# Patient Record
Sex: MALE | Race: WHITE | NOT HISPANIC OR LATINO | URBAN - METROPOLITAN AREA
[De-identification: names, ages, dates, MRNs, and addresses within clinical notes are randomized per-mention and may not be internally consistent; named-entity substitution may affect disease eponyms.]

---

## 2019-11-11 PROBLEM — Z00.00 ENCOUNTER FOR PREVENTIVE HEALTH EXAMINATION: Status: ACTIVE | Noted: 2019-11-11

## 2022-09-09 ENCOUNTER — INPATIENT (INPATIENT)
Facility: HOSPITAL | Age: 82
LOS: 1 days | Discharge: HOME | End: 2022-09-11
Attending: INTERNAL MEDICINE | Admitting: INTERNAL MEDICINE

## 2022-09-09 VITALS
TEMPERATURE: 98 F | OXYGEN SATURATION: 97 % | HEART RATE: 113 BPM | RESPIRATION RATE: 20 BRPM | SYSTOLIC BLOOD PRESSURE: 132 MMHG | WEIGHT: 190.04 LBS | DIASTOLIC BLOOD PRESSURE: 59 MMHG

## 2022-09-09 LAB
ABO RH CONFIRMATION: SIGNIFICANT CHANGE UP
ALBUMIN SERPL ELPH-MCNC: 3.9 G/DL — SIGNIFICANT CHANGE UP (ref 3.5–5.2)
ALP SERPL-CCNC: 62 U/L — SIGNIFICANT CHANGE UP (ref 30–115)
ALT FLD-CCNC: 13 U/L — SIGNIFICANT CHANGE UP (ref 0–41)
ANION GAP SERPL CALC-SCNC: 10 MMOL/L — SIGNIFICANT CHANGE UP (ref 7–14)
APPEARANCE UR: ABNORMAL
APTT BLD: 88.2 SEC — CRITICAL HIGH (ref 27–39.2)
AST SERPL-CCNC: 34 U/L — SIGNIFICANT CHANGE UP (ref 0–41)
BACTERIA # UR AUTO: NEGATIVE — SIGNIFICANT CHANGE UP
BASOPHILS # BLD AUTO: 0.03 K/UL — SIGNIFICANT CHANGE UP (ref 0–0.2)
BASOPHILS NFR BLD AUTO: 0.5 % — SIGNIFICANT CHANGE UP (ref 0–1)
BILIRUB SERPL-MCNC: 1.1 MG/DL — SIGNIFICANT CHANGE UP (ref 0.2–1.2)
BILIRUB UR-MCNC: NEGATIVE — SIGNIFICANT CHANGE UP
BLD GP AB SCN SERPL QL: SIGNIFICANT CHANGE UP
BUN SERPL-MCNC: 25 MG/DL — HIGH (ref 10–20)
CALCIUM SERPL-MCNC: 8.6 MG/DL — SIGNIFICANT CHANGE UP (ref 8.5–10.1)
CHLORIDE SERPL-SCNC: 103 MMOL/L — SIGNIFICANT CHANGE UP (ref 98–110)
CO2 SERPL-SCNC: 25 MMOL/L — SIGNIFICANT CHANGE UP (ref 17–32)
COLOR SPEC: ABNORMAL
CREAT SERPL-MCNC: 1.3 MG/DL — SIGNIFICANT CHANGE UP (ref 0.7–1.5)
DIFF PNL FLD: ABNORMAL
EGFR: 56 ML/MIN/1.73M2 — LOW
EOSINOPHIL # BLD AUTO: 0.18 K/UL — SIGNIFICANT CHANGE UP (ref 0–0.7)
EOSINOPHIL NFR BLD AUTO: 3.3 % — SIGNIFICANT CHANGE UP (ref 0–8)
EPI CELLS # UR: 1 /HPF — SIGNIFICANT CHANGE UP (ref 0–5)
GLUCOSE SERPL-MCNC: 96 MG/DL — SIGNIFICANT CHANGE UP (ref 70–99)
GLUCOSE UR QL: NEGATIVE — SIGNIFICANT CHANGE UP
HCT VFR BLD CALC: 33.4 % — LOW (ref 42–52)
HGB BLD-MCNC: 11.2 G/DL — LOW (ref 14–18)
HYALINE CASTS # UR AUTO: 0 /LPF — SIGNIFICANT CHANGE UP (ref 0–7)
IMM GRANULOCYTES NFR BLD AUTO: 0.2 % — SIGNIFICANT CHANGE UP (ref 0.1–0.3)
INR BLD: 15.46 RATIO — CRITICAL HIGH (ref 0.65–1.3)
KETONES UR-MCNC: SIGNIFICANT CHANGE UP
LEUKOCYTE ESTERASE UR-ACNC: ABNORMAL
LYMPHOCYTES # BLD AUTO: 0.98 K/UL — LOW (ref 1.2–3.4)
LYMPHOCYTES # BLD AUTO: 17.7 % — LOW (ref 20.5–51.1)
MCHC RBC-ENTMCNC: 32.9 PG — HIGH (ref 27–31)
MCHC RBC-ENTMCNC: 33.5 G/DL — SIGNIFICANT CHANGE UP (ref 32–37)
MCV RBC AUTO: 98.2 FL — HIGH (ref 80–94)
MONOCYTES # BLD AUTO: 0.81 K/UL — HIGH (ref 0.1–0.6)
MONOCYTES NFR BLD AUTO: 14.6 % — HIGH (ref 1.7–9.3)
NEUTROPHILS # BLD AUTO: 3.52 K/UL — SIGNIFICANT CHANGE UP (ref 1.4–6.5)
NEUTROPHILS NFR BLD AUTO: 63.7 % — SIGNIFICANT CHANGE UP (ref 42.2–75.2)
NITRITE UR-MCNC: NEGATIVE — SIGNIFICANT CHANGE UP
NRBC # BLD: 0 /100 WBCS — SIGNIFICANT CHANGE UP (ref 0–0)
PH UR: 6 — SIGNIFICANT CHANGE UP (ref 5–8)
PLATELET # BLD AUTO: 181 K/UL — SIGNIFICANT CHANGE UP (ref 130–400)
POTASSIUM SERPL-MCNC: 4.9 MMOL/L — SIGNIFICANT CHANGE UP (ref 3.5–5)
POTASSIUM SERPL-SCNC: 4.9 MMOL/L — SIGNIFICANT CHANGE UP (ref 3.5–5)
PROT SERPL-MCNC: 5.8 G/DL — LOW (ref 6–8)
PROT UR-MCNC: ABNORMAL
PROTHROM AB SERPL-ACNC: >40 SEC — HIGH (ref 9.95–12.87)
RBC # BLD: 3.4 M/UL — LOW (ref 4.7–6.1)
RBC # FLD: 13.4 % — SIGNIFICANT CHANGE UP (ref 11.5–14.5)
RBC CASTS # UR COMP ASSIST: >720 /HPF — HIGH (ref 0–4)
SARS-COV-2 RNA SPEC QL NAA+PROBE: SIGNIFICANT CHANGE UP
SODIUM SERPL-SCNC: 138 MMOL/L — SIGNIFICANT CHANGE UP (ref 135–146)
SP GR SPEC: 1.02 — SIGNIFICANT CHANGE UP (ref 1.01–1.03)
UROBILINOGEN FLD QL: SIGNIFICANT CHANGE UP
WBC # BLD: 5.53 K/UL — SIGNIFICANT CHANGE UP (ref 4.8–10.8)
WBC # FLD AUTO: 5.53 K/UL — SIGNIFICANT CHANGE UP (ref 4.8–10.8)
WBC UR QL: 14 /HPF — HIGH (ref 0–5)

## 2022-09-09 PROCEDURE — 99285 EMERGENCY DEPT VISIT HI MDM: CPT

## 2022-09-09 PROCEDURE — 99221 1ST HOSP IP/OBS SF/LOW 40: CPT | Mod: GC

## 2022-09-09 RX ORDER — INFLUENZA VIRUS VACCINE 15; 15; 15; 15 UG/.5ML; UG/.5ML; UG/.5ML; UG/.5ML
0.7 SUSPENSION INTRAMUSCULAR ONCE
Refills: 0 | Status: DISCONTINUED | OUTPATIENT
Start: 2022-09-09 | End: 2022-09-11

## 2022-09-09 RX ORDER — WARFARIN SODIUM 2.5 MG/1
1 TABLET ORAL
Qty: 0 | Refills: 0 | DISCHARGE

## 2022-09-09 RX ORDER — METOPROLOL TARTRATE 50 MG
75 TABLET ORAL DAILY
Refills: 0 | Status: DISCONTINUED | OUTPATIENT
Start: 2022-09-09 | End: 2022-09-11

## 2022-09-09 RX ORDER — PHYTONADIONE (VIT K1) 5 MG
2.5 TABLET ORAL ONCE
Refills: 0 | Status: COMPLETED | OUTPATIENT
Start: 2022-09-09 | End: 2022-09-09

## 2022-09-09 RX ORDER — METOPROLOL TARTRATE 50 MG
1 TABLET ORAL
Qty: 0 | Refills: 0 | DISCHARGE

## 2022-09-09 RX ORDER — METOPROLOL TARTRATE 50 MG
3 TABLET ORAL
Qty: 0 | Refills: 0 | DISCHARGE

## 2022-09-09 RX ORDER — PHYTONADIONE (VIT K1) 5 MG
2.5 TABLET ORAL ONCE
Refills: 0 | Status: DISCONTINUED | OUTPATIENT
Start: 2022-09-09 | End: 2022-09-09

## 2022-09-09 RX ADMIN — Medication 2.5 MILLIGRAM(S): at 21:09

## 2022-09-09 RX ADMIN — Medication 2.5 MILLIGRAM(S): at 18:16

## 2022-09-09 NOTE — ED PROVIDER NOTE - NS ED ROS FT
Eyes:  No visual changes, eye pain or discharge.  ENMT:  No hearing changes, pain, discharge or infections. No neck pain or stiffness.  Cardiac:  No chest pain, SOB or edema. No chest pain with exertion.  Respiratory:  No cough or respiratory distress. No hemoptysis. No history of asthma or RAD.  GI:  No nausea, vomiting, diarrhea or abdominal pain.  :  + hematuria No dysuria, frequency or burning.  MS:  No myalgia, muscle weakness, joint pain or back pain.  Neuro:  No headache or weakness.  No LOC.  Skin:  No skin rash.   Endocrine: No history of thyroid disease or diabetes.  Except as documented in the HPI,  all other systems are negative.

## 2022-09-09 NOTE — ED PROVIDER NOTE - NS ED ATTENDING STATEMENT MOD
This was a shared visit with the LEELA. I reviewed and verified the documentation and independently performed the documented:

## 2022-09-09 NOTE — ED PROVIDER NOTE - PHYSICAL EXAMINATION
VITAL SIGNS: I have reviewed nursing notes and confirm.  CONSTITUTIONAL: Well-developed; well-nourished  SKIN: Large bruise to right thigh, small bruises to bilateral forearms and lower back.  HEAD: Normocephalic; atraumatic.  EYES:  conjunctiva and sclera clear.  ENT: No nasal discharge; airway clear.  NECK: Supple; non tender.  CARD: S1, S2 normal; no murmurs, gallops, or rubs. Regular rate and rhythm.   RESP: No wheezes, rales or rhonchi.  ABD: Normal bowel sounds; soft; non-distended; non-tender  MORRIS: Light brown stool  EXT: Chronic venous stasis Lower extremities Normal ROM.  No clubbing, cyanosis   LYMPH: No acute cervical adenopathy.  NEURO: Alert, oriented, grossly unremarkable  PSYCH: Cooperative, appropriate.

## 2022-09-09 NOTE — ED PROVIDER NOTE - ATTENDING APP SHARED VISIT CONTRIBUTION OF CARE
82-year-old male past medical history of A. fib on Coumadin presented for evaluation of elevated INR after mistakenly taking extra doses of warfarin.  According to the patient pharmacist dispensed warfarin instead of metoprolol. Patient noticed increased bruising and hematuria over the past several days.  Denies any headache, nausea/ vomiting, black or bloody stools, chest pain, dizziness or lightheadedness, abdominal flank pain or any other additional complaints.  Well-appearing elderly male resting on a stretcher in no acute distress, PERRL, pink conjunctiva, MMM, normal work of breathing, speaking full sentences, lungs clear to auscultation bilaterally, irregular rate, distal pulses intact, abdomen soft/NT/ND, no midline spine or CVA TTP, large ecchymosis on the back of the right thigh, additional superficial ecchymosis on bilateral forearms, patient is awake and alert x3, no focal neurodeficits, very pleasant and cooperative. INR is elevated, patient was given dose of oral vitamin K, admitted for monitoring of INR.  We will hold warfarin until INR normalizes.

## 2022-09-09 NOTE — H&P ADULT - ASSESSMENT
Patient is 82 year old  male with history of A-Fib on Coumadin (unclear if valvular, follows Dr. Plummer), DVT and PE, left eye glaucoma s/p lens replacement, right eye blurriness who presents to the ED for INR of 8.84 on outpatient labs and bruising to his left forearm and right thigh and hematuria.  Found to have INR of 15 here.    #Supratherapeutic INR due to Coumadin Toxicity  -on Coumadin 7.5 weekdays and 5.0 weekends - took double dose for 3 weeks and 25 mg yesterday  -+hematuria, no other activ  -hgb stable  -repeat INR at 11:30 PM w/ CBC and daily INR  -active T&S  -refer to Coumadin clinic on discharge once INR is okay. pt appears he is comfortable with Coumadin and does not want alternative anticoagulation  -sp pulm eval in ED- not candidate for ICU at this time  -low threshold for upgrade to ICU if pt actively bleeds or becomes hemodynamically unstable     #Hematuria due to Coumadin toxicity   -hold Coumadin  -monitor    #Bilateral calf pain - resolving  -doubt DVT given bilateral nature and supratherapeutic INR  -upon movement suggests possibly arterial, however has distal pulses and warmth   -if recurs, would check arterial and venous duplex  -check CK    #Hx DVT and PE  -hold Coumadin for now due to above    #Hx Left eye glaucoma s/p lens repalcement  -on latanoprost? at home - please verify with wife as pt unsure    DVT prophylaxis - SCD, held due to above  Diet - DASH/TLC  Activity - IAT  Code Status - Full - discussed with patient.  Patient is 82 year old  male with history of A-Fib on Coumadin (unclear if valvular, follows Dr. Plummer), DVT and PE, left eye glaucoma s/p lens replacement, right eye blurriness who presents to the ED for INR of 8.84 on outpatient labs and bruising to his left forearm and right thigh and hematuria.  Found to have INR of 15 here.    #Supratherapeutic INR due to Coumadin Toxicity  -on Coumadin 7.5 weekdays and 5.0 weekends - took double dose for 3 weeks and 25 mg yesterday  -+hematuria and bruising, no other evidence of bleed  -sp pulm eval in ED- not candidate for ICU at this time. No need for FFPs  -give 5 mg Vitamin K  -hgb stable  -repeat INR at 11:30 PM w/ CBC and daily INR  -active T&S  -refer to Coumadin clinic on discharge once INR is okay. pt appears he is comfortable with Coumadin and does not want alternative anticoagulation  -low threshold for upgrade to ICU if pt actively bleeds or becomes hemodynamically unstable     #Hematuria due to Coumadin toxicity   -hold Coumadin  -monitor    #Bilateral calf pain - resolving  -doubt DVT given bilateral nature and supratherapeutic INR  -upon movement suggests possibly arterial, however has distal pulses and warmth   -if recurs, would check arterial and venous duplex  -check CK    #Hx DVT and PE  -hold Coumadin for now due to above    #Hx Left eye glaucoma s/p lens repalcement  -on latanoprost? at home - please verify with wife as pt unsure    DVT prophylaxis - SCD, held due to above  Diet - DASH/TLC  Activity - IAT  Code Status - Full - discussed with patient.  Patient is 82 year old  male with history of A-Fib on Coumadin (unclear if valvular, follows Dr. Plummer), DVT and PE, left eye glaucoma s/p lens replacement, right eye blurriness who presents to the ED for INR of 8.84 on outpatient labs and bruising to his left forearm and right thigh and hematuria.  Found to have INR of 15 here.    #Supratherapeutic INR due to Coumadin Toxicity  -on Coumadin 7.5 weekdays and 5.0 weekends - took double dose for 3 weeks and 25 mg yesterday  -+hematuria and bruising, no other evidence of bleed  -sp pulm eval in ED- not candidate for ICU at this time. No need for FFPs  -give 5 mg Vitamin K  -hgb stable  -repeat INR at 11:30 PM w/ CBC and daily INR  -active T&S  -refer to Coumadin clinic on discharge once INR is okay. pt appears he is comfortable with Coumadin and does not want alternative anticoagulation  -low threshold for upgrade to ICU if pt actively bleeds or becomes hemodynamically unstable     #Hematuria due to Coumadin toxicity   -hold Coumadin  -monitor    #Chronic AFib on Coumadin  -hold Coumadin  -continue Metoprolol 75 QD   -check EKG if HR persistently >110 and A-Fib, monitor on telemetry    #Bilateral calf pain - resolving  -doubt DVT given bilateral nature and supratherapeutic INR  -upon movement suggests possibly arterial, however has distal pulses and warmth   -if recurs, would check arterial and venous duplex  -check CK    #Hx DVT and PE  -hold Coumadin for now due to above    #Hx Left eye glaucoma s/p lens repalcement  -on latanoprost? at home - please verify with wife as pt unsure    DVT prophylaxis - SCD, held due to above  Diet - DASH/TLC  Activity - IAT  Code Status - Full - discussed with patient.

## 2022-09-09 NOTE — ED ADULT NURSE NOTE - CHIEF COMPLAINT QUOTE
"the pharmacist gave double of my warfarin dose by putting it in the wrong bottle . I have been taking the wrong dose  (extra 10mg) for the past 3 weeks." pt states he was taking Warfarin instead of Metoprolol. pt reports new bruises to the left upper arm and right lower extremity, bilateral lower extremity pain and blood in the urine. pt went to [facility] on Wednesday INR 8.84 , PT 90.0

## 2022-09-09 NOTE — H&P ADULT - HISTORY OF PRESENT ILLNESS
Patient is 82 year old  male with history of A-Fib on Coumadin (unclear if valvular, follows Dr. Plummer), DVT and PE, left eye glaucoma s/p lens replacement, right eye blurriness who presents to the ED for INR of 8.84 on outpatient labs and bruising to his left forearm and right thigh and hematuria. He states due to a pharmacy error and given his vision trouble, he took double the dose of Coumadin for 3 weeks. Normally takes 7.5 mg on weekdays and 5.0 on weekends, has been doing so for 20+ years and gets INR checked every 3 months. Reports he was supposed to get routine INR checked in March by his cardiologist, did not check then but when he developed recent sx of bleeding and calf pain he went to check his INR and was told it was 8.84 with PTT 80s. He also took 25 mg dose yesterday on his own due to concern for DVT. His symptoms include worsening hematuria and over the past few weeks week with bruising to his left forearm and right thigh from minor bumping inside the car. He reports bilateral calf pain when standing and walking this past week none currently. ROS otherwise negative and he feels well otherwise. No melena, hematochezia, epistaxis.     In ED: vitals notable for mild tachycardia. Hgb 11.2. Plt 181. INR 15.4 PTT 88.2. Many RBC in urine. Given 2.5 mg vitamin K, ordered for 2.5 more.   T(F): 97.6 (09-09-22 @ 15:07), Max: 97.6 (09-09-22 @ 15:07)  HR: 113 (09-09-22 @ 15:07) (113 - 113)  BP: 132/59 (09-09-22 @ 15:07) (132/59 - 132/59)  RR: 20 (09-09-22 @ 15:07) (20 - 20)  SpO2: 97% (09-09-22 @ 15:07) (97% - 97%)

## 2022-09-09 NOTE — H&P ADULT - NSHPPHYSICALEXAM_GEN_ALL_CORE
General: no acute distress well appearing male  Head: atraumatic, normocephalic  Neck: no lymphadenopathy, no tracheal deviation  Eyes: EOMI, no icterus  Lungs/Chest: Clear to auscultation bilaterally, no wheeze  Heart: regular rate, regular rhythm, S1/S2  Abdomen: BS audible, non-distended, soft, non-tender, no rigidity, no guarding  Extremities: no clubbing, no cyanosis, no edema  Skin: warm and dry. +purplish ecchymosis noted over left forearm and right thigh.   Neuro: AAOx3, speech clear and fluent, moving all extremities

## 2022-09-09 NOTE — H&P ADULT - ATTENDING COMMENTS
HPI:  Patient is 82 year old  male with history of A-Fib on Coumadin (unclear if valvular, follows Dr. Plummer), DVT and PE, left eye glaucoma s/p lens replacement, right eye blurriness who presents to the ED for INR of 8.84 on outpatient labs and bruising to his left forearm and right thigh and hematuria. He states due to a pharmacy error and given his vision trouble, he took double the dose of Coumadin for 3 weeks. Normally takes 7.5 mg on weekdays and 5.0 on weekends, has been doing so for 20+ years and gets INR checked every 3 months. Reports he was supposed to get routine INR checked in March by his cardiologist, did not check then but when he developed recent sx of bleeding and calf pain he went to check his INR and was told it was 8.84 with PTT 80s. He also took 25 mg dose yesterday on his own due to concern for DVT. His symptoms include worsening hematuria and over the past few weeks week with bruising to his left forearm and right thigh from minor bumping inside the car. He reports bilateral calf pain when standing and walking this past week none currently. ROS otherwise negative and he feels well otherwise. No melena, hematochezia, epistaxis.     In ED: vitals notable for mild tachycardia. Hgb 11.2. Plt 181. INR 15.4 PTT 88.2. Many RBC in urine. Given 2.5 mg vitamin K, ordered for 2.5 more.   T(F): 97.6 (09-09-22 @ 15:07), Max: 97.6 (09-09-22 @ 15:07)  HR: 113 (09-09-22 @ 15:07) (113 - 113)  BP: 132/59 (09-09-22 @ 15:07) (132/59 - 132/59)  RR: 20 (09-09-22 @ 15:07) (20 - 20)  SpO2: 97% (09-09-22 @ 15:07) (97% - 97%) (09 Sep 2022 19:43)    REVIEW OF SYSTEMS: see cc/HPI   CONSTITUTIONAL: No weakness, fevers or chills  EYES/ENT: No visual changes;  No vertigo or throat pain   NECK: No pain or stiffness  RESPIRATORY: No cough, wheezing, hemoptysis; No shortness of breath  CARDIOVASCULAR: No chest pain or palpitations  GASTROINTESTINAL: No abdominal or epigastric pain. No nausea, vomiting, or hematemesis; No diarrhea or constipation. No melena or hematochezia.  GENITOURINARY: No dysuria, frequency, (+) hematuria  NEUROLOGICAL: No numbness or weakness  SKIN: No itching, rashes    Physical Exam:   General: WN/WD NAD  Neurology: A&Ox3, nonfocal, follows commands  Eyes: PERRLA/ EOMI  ENT/Neck: Neck supple, trachea midline, No JVD  Respiratory: CTA B/L, No wheezing, rales, rhonchi  CV: Normal rate regular rhythm, S1S2, no murmurs, rubs or gallops  Abdominal: Soft, NT, ND +BS,   Extremities: No edema, + peripheral pulses  Skin: No Rashes, Hematoma, Ecchymosis  Incisions:   Tubes: HPI:  Patient is 82 year old  male with history of A-Fib on Coumadin (unclear if valvular, follows Dr. Plummer), DVT and PE, left eye glaucoma s/p lens replacement, right eye blurriness who presents to the ED for INR of 8.84 on outpatient labs and bruising to his left forearm and right thigh and hematuria. He states due to a pharmacy error and given his vision trouble, he took double the dose of Coumadin for 3 weeks. Normally takes 7.5 mg on weekdays and 5.0 on weekends, has been doing so for 20+ years and gets INR checked every 3 months. Reports he was supposed to get routine INR checked in March by his cardiologist, did not check then but when he developed recent sx of bleeding and calf pain he went to check his INR and was told it was 8.84 with PTT 80s. He also took 25 mg dose yesterday on his own due to concern for DVT. His symptoms include worsening hematuria and over the past few weeks week with bruising to his left forearm and right thigh from minor bumping inside the car. He reports bilateral calf pain when standing and walking this past week none currently. ROS otherwise negative and he feels well otherwise. No melena, hematochezia, epistaxis.     In ED: vitals notable for mild tachycardia. Hgb 11.2. Plt 181. INR 15.4 PTT 88.2. Many RBC in urine. Given 2.5 mg vitamin K, ordered for 2.5 more.   T(F): 97.6 (09-09-22 @ 15:07), Max: 97.6 (09-09-22 @ 15:07)  HR: 113 (09-09-22 @ 15:07) (113 - 113)  BP: 132/59 (09-09-22 @ 15:07) (132/59 - 132/59)  RR: 20 (09-09-22 @ 15:07) (20 - 20)  SpO2: 97% (09-09-22 @ 15:07) (97% - 97%) (09 Sep 2022 19:43)    REVIEW OF SYSTEMS: see cc/HPI   CONSTITUTIONAL: No weakness, fevers or chills  EYES/ENT: No visual changes;  No vertigo or throat pain   NECK: No pain or stiffness  RESPIRATORY: No cough, wheezing, hemoptysis; No shortness of breath  CARDIOVASCULAR: No chest pain or palpitations  GASTROINTESTINAL: No abdominal or epigastric pain. No nausea, vomiting, or hematemesis; No diarrhea or constipation. No melena or hematochezia.  GENITOURINARY: No dysuria, frequency, (+) hematuria  NEUROLOGICAL: No numbness or weakness  SKIN: No itching, rashes, see cc/HPI - bruising     Physical Exam:   General: WN/WD NAD  Neurology: A&Ox3, nonfocal, follows commands  Eyes: PERRLA/ EOMI  ENT/Neck: Neck supple, trachea midline, No JVD  Respiratory: CTA B/L, No wheezing, rales, rhonchi  CV: Normal rate regular rhythm, S1S2, no murmurs, rubs or gallops  Abdominal: Soft, NT, ND +BS,   Extremities: No edema, + peripheral pulses  Skin: No Rashes, Hematoma, (+) bruising / ecchymosis  Incisions:   Tubes:    A/p  Coumadin toxicity / compliance issues - s/p Vit K in ED  Hematuria   -hold coumadin   -serial INR monitoring   -serial CBC   -appropriate dosing and compliance stressed    Chronic A. fib   H/o DVT /PE  -c/w rate control for now   -goal of INR 2-3, restart coumadin when therapeutic     Glaucoma   -c/w latanoprost ( patient may use his own)    Patient seen by Attending 9/09/22 ( note revised 09/10)

## 2022-09-09 NOTE — H&P ADULT - NSHPREVIEWOFSYSTEMS_GEN_ALL_CORE
REVIEW OF SYSTEMS:    CONSTITUTIONAL: No weakness, fevers or chills, unintentional weight loss  EYES/ENT: No visual changes;  No vertigo or throat pain   NECK: No pain or stiffness  RESPIRATORY: No shortness of breath, cough, wheezing, hemoptysis  CARDIOVASCULAR: No chest pain, palpitations, dizziness  GASTROINTESTINAL: No abdominal or epigastric pain. No nausea, vomiting, or hematemesis; No diarrhea or constipation. No melena or hematochezia.  GENITOURINARY: No dysuria, frequency, hematuria  EXTREMITIES: No edema, cyanosis  SKIN: No itching, rashes. +bruising   MUSCULOSKELETAL: No injury  NEUROLOGICAL: No weakness

## 2022-09-09 NOTE — ED ADULT NURSE NOTE - OBJECTIVE STATEMENT
pt a&ox3, in ED for double dosing on warfarin for 3 weeks, as per pt the pills looked similar and he thought it was the metoprolol, pt only takes 2 tabs, one warfarin and one metoprolol, pt usually takes 7.5mg, however has been taking 15mg ( believing its metoprolol), pt also mentioned he took 4 tabs of warfarin one day b/c of a hx of blood clots, and he had leg pain that day, so he was concerned it was a blood clot, pt expresses a decrease in vision, Pt noted to have gross hematuria, and bruising to right inner thigh and b/l upper extremities.

## 2022-09-09 NOTE — ED ADULT TRIAGE NOTE - CHIEF COMPLAINT QUOTE
"the pharmacist gave double of my warfarin dose by putting it in the wrong bottle . I have been taking the wrong dose  (extra 10mg) for the past 3 weeks." pt states he was taking Warfarin instead of Metoprolol. pt reports new bruises to the left upper arm and right lower extremity, bilateral lower extremity pain and blood in the urine. pt went to [facility] on Wednesday INR 8.84 , PT 90.0

## 2022-09-09 NOTE — PATIENT PROFILE ADULT - FALL HARM RISK - HARM RISK INTERVENTIONS

## 2022-09-09 NOTE — H&P ADULT - NSHPLABSRESULTS_GEN_ALL_CORE
11.2   5.53  )-----------( 181      ( 09 Sep 2022 16:50 )             33.4         138  |  103  |  25<H>  ----------------------------<  96  4.9   |  25  |  1.3    Ca    8.6      09 Sep 2022 16:50    TPro  5.8<L>  /  Alb  3.9  /  TBili  1.1  /  DBili  x   /  AST  34  /  ALT  13  /  AlkPhos  62          PT/INR - ( 09 Sep 2022 16:50 )   PT: >40.00 sec;   INR: 15.46 ratio         PTT - ( 09 Sep 2022 16:50 )  PTT:88.2 sec    Urinalysis Basic - ( 09 Sep 2022 17:27 )    Color: Orange / Appearance: Turbid / S.022 / pH: x  Gluc: x / Ketone: Trace  / Bili: Negative / Urobili: <2 mg/dL   Blood: x / Protein: 100 mg/dL / Nitrite: Negative   Leuk Esterase: Small / RBC: >720 /HPF / WBC 14 /HPF   Sq Epi: x / Non Sq Epi: 1 /HPF / Bacteria: Negative        I&O's Summary            CAPILLARY BLOOD GLUCOSE          ABG        RADIOLOGY & OTHER STUDIES

## 2022-09-10 LAB
APTT BLD: 73.7 SEC — CRITICAL HIGH (ref 27–39.2)
APTT BLD: 81.1 SEC — CRITICAL HIGH (ref 27–39.2)
BASOPHILS # BLD AUTO: 0.02 K/UL — SIGNIFICANT CHANGE UP (ref 0–0.2)
BASOPHILS # BLD AUTO: 0.03 K/UL — SIGNIFICANT CHANGE UP (ref 0–0.2)
BASOPHILS NFR BLD AUTO: 0.5 % — SIGNIFICANT CHANGE UP (ref 0–1)
BASOPHILS NFR BLD AUTO: 0.6 % — SIGNIFICANT CHANGE UP (ref 0–1)
CK SERPL-CCNC: 272 U/L — HIGH (ref 0–225)
EOSINOPHIL # BLD AUTO: 0.15 K/UL — SIGNIFICANT CHANGE UP (ref 0–0.7)
EOSINOPHIL # BLD AUTO: 0.21 K/UL — SIGNIFICANT CHANGE UP (ref 0–0.7)
EOSINOPHIL NFR BLD AUTO: 3.4 % — SIGNIFICANT CHANGE UP (ref 0–8)
EOSINOPHIL NFR BLD AUTO: 4.5 % — SIGNIFICANT CHANGE UP (ref 0–8)
HCT VFR BLD CALC: 32.7 % — LOW (ref 42–52)
HCT VFR BLD CALC: 33.8 % — LOW (ref 42–52)
HGB BLD-MCNC: 11 G/DL — LOW (ref 14–18)
HGB BLD-MCNC: 11.1 G/DL — LOW (ref 14–18)
IMM GRANULOCYTES NFR BLD AUTO: 0.4 % — HIGH (ref 0.1–0.3)
IMM GRANULOCYTES NFR BLD AUTO: 0.5 % — HIGH (ref 0.1–0.3)
INR BLD: 13.24 RATIO — CRITICAL HIGH (ref 0.65–1.3)
INR BLD: 8.51 RATIO — CRITICAL HIGH (ref 0.65–1.3)
LYMPHOCYTES # BLD AUTO: 0.67 K/UL — LOW (ref 1.2–3.4)
LYMPHOCYTES # BLD AUTO: 0.94 K/UL — LOW (ref 1.2–3.4)
LYMPHOCYTES # BLD AUTO: 15.4 % — LOW (ref 20.5–51.1)
LYMPHOCYTES # BLD AUTO: 20.1 % — LOW (ref 20.5–51.1)
MCHC RBC-ENTMCNC: 32.1 PG — HIGH (ref 27–31)
MCHC RBC-ENTMCNC: 32.8 G/DL — SIGNIFICANT CHANGE UP (ref 32–37)
MCHC RBC-ENTMCNC: 32.8 PG — HIGH (ref 27–31)
MCHC RBC-ENTMCNC: 33.6 G/DL — SIGNIFICANT CHANGE UP (ref 32–37)
MCV RBC AUTO: 97.6 FL — HIGH (ref 80–94)
MCV RBC AUTO: 97.7 FL — HIGH (ref 80–94)
MONOCYTES # BLD AUTO: 0.57 K/UL — SIGNIFICANT CHANGE UP (ref 0.1–0.6)
MONOCYTES # BLD AUTO: 0.67 K/UL — HIGH (ref 0.1–0.6)
MONOCYTES NFR BLD AUTO: 13.1 % — HIGH (ref 1.7–9.3)
MONOCYTES NFR BLD AUTO: 14.3 % — HIGH (ref 1.7–9.3)
NEUTROPHILS # BLD AUTO: 2.81 K/UL — SIGNIFICANT CHANGE UP (ref 1.4–6.5)
NEUTROPHILS # BLD AUTO: 2.92 K/UL — SIGNIFICANT CHANGE UP (ref 1.4–6.5)
NEUTROPHILS NFR BLD AUTO: 60.1 % — SIGNIFICANT CHANGE UP (ref 42.2–75.2)
NEUTROPHILS NFR BLD AUTO: 67.1 % — SIGNIFICANT CHANGE UP (ref 42.2–75.2)
NRBC # BLD: 0 /100 WBCS — SIGNIFICANT CHANGE UP (ref 0–0)
NRBC # BLD: 0 /100 WBCS — SIGNIFICANT CHANGE UP (ref 0–0)
PLATELET # BLD AUTO: 172 K/UL — SIGNIFICANT CHANGE UP (ref 130–400)
PLATELET # BLD AUTO: 185 K/UL — SIGNIFICANT CHANGE UP (ref 130–400)
PROTHROM AB SERPL-ACNC: >40 SEC — HIGH (ref 9.95–12.87)
PROTHROM AB SERPL-ACNC: >40 SEC — HIGH (ref 9.95–12.87)
RBC # BLD: 3.35 M/UL — LOW (ref 4.7–6.1)
RBC # BLD: 3.46 M/UL — LOW (ref 4.7–6.1)
RBC # FLD: 13.2 % — SIGNIFICANT CHANGE UP (ref 11.5–14.5)
RBC # FLD: 13.3 % — SIGNIFICANT CHANGE UP (ref 11.5–14.5)
SARS-COV-2 RNA SPEC QL NAA+PROBE: SIGNIFICANT CHANGE UP
WBC # BLD: 4.35 K/UL — LOW (ref 4.8–10.8)
WBC # BLD: 4.68 K/UL — LOW (ref 4.8–10.8)
WBC # FLD AUTO: 4.35 K/UL — LOW (ref 4.8–10.8)
WBC # FLD AUTO: 4.68 K/UL — LOW (ref 4.8–10.8)

## 2022-09-10 PROCEDURE — 99233 SBSQ HOSP IP/OBS HIGH 50: CPT

## 2022-09-10 PROCEDURE — 99222 1ST HOSP IP/OBS MODERATE 55: CPT | Mod: GC

## 2022-09-10 PROCEDURE — 93010 ELECTROCARDIOGRAM REPORT: CPT

## 2022-09-10 RX ORDER — PHYTONADIONE (VIT K1) 5 MG
5 TABLET ORAL ONCE
Refills: 0 | Status: COMPLETED | OUTPATIENT
Start: 2022-09-10 | End: 2022-09-10

## 2022-09-10 RX ORDER — ACETAMINOPHEN 500 MG
650 TABLET ORAL EVERY 6 HOURS
Refills: 0 | Status: DISCONTINUED | OUTPATIENT
Start: 2022-09-10 | End: 2022-09-11

## 2022-09-10 RX ADMIN — Medication 5 MILLIGRAM(S): at 14:09

## 2022-09-10 RX ADMIN — Medication 75 MILLIGRAM(S): at 06:22

## 2022-09-10 RX ADMIN — Medication 650 MILLIGRAM(S): at 21:55

## 2022-09-10 RX ADMIN — Medication 650 MILLIGRAM(S): at 22:50

## 2022-09-10 NOTE — CONSULT NOTE ADULT - SUBJECTIVE AND OBJECTIVE BOX
Patient is a 82y old  Male who presents with a chief complaint of elevated INR (10 Sep 2022 11:25)      HPI:  Patient is 82 year old  male with history of A-Fib on Coumadin (unclear if valvular, follows Dr. Plummer), DVT and PE, left eye glaucoma s/p lens replacement, right eye blurriness who presents to the ED for INR of 8.84 on outpatient labs and bruising to his left forearm and right thigh and hematuria. He states due to a pharmacy error and given his vision trouble, he took double the dose of Coumadin for 3 weeks. Normally takes 7.5 mg on weekdays and 5.0 on weekends, has been doing so for 20+ years and gets INR checked every 3 months. Reports he was supposed to get routine INR checked in March by his cardiologist, did not check then but when he developed recent sx of bleeding and calf pain he went to check his INR and was told it was 8.84 with PTT 80s. He also took 25 mg dose yesterday on his own due to concern for DVT. His symptoms include worsening hematuria and over the past few weeks week with bruising to his left forearm and right thigh from minor bumping inside the car. He reports bilateral calf pain when standing and walking this past week none currently. ROS otherwise negative and he feels well otherwise. No melena, hematochezia, epistaxis.     In ED: vitals notable for mild tachycardia. Hgb 11.2. Plt 181. INR 15.4 PTT 88.2. Many RBC in urine. Given 2.5 mg vitamin K, ordered for 2.5 more.   T(F): 97.6 (22 @ 15:07), Max: 97.6 (22 @ 15:07)  HR: 113 (22 @ 15:07) (113 - 113)  BP: 132/59 (22 @ 15:07) (132/59 - 132/59)  RR: 20 (22 @ 15:07) (20 - 20)  SpO2: 97% (22 @ 15:07) (97% - 97%) (09 Sep 2022 19:43)      PAST MEDICAL & SURGICAL HISTORY:      SOCIAL HX:  non smoker                          FAMILY HISTORY:  :  No known cardiovacular family hisotry     Review Of Systems:     All ROS are negative except per HPI       Allergies    No Known Allergies    Intolerances          PHYSICAL EXAM    ICU Vital Signs Last 24 Hrs  T(C): 35.8 (10 Sep 2022 05:43), Max: 36.6 (10 Sep 2022 01:00)  T(F): 96.4 (10 Sep 2022 05:43), Max: 97.9 (10 Sep 2022 01:00)  HR: 75 (10 Sep 2022 05:43) (75 - 113)  BP: 123/70 (10 Sep 2022 05:43) (102/69 - 132/59)  BP(mean): --  ABP: --  ABP(mean): --  RR: 18 (10 Sep 2022 05:) (18 - 20)  SpO2: 98% (10 Sep 2022 05:43) (97% - 99%)    O2 Parameters below as of 10 Sep 2022 05:43  Patient On (Oxygen Delivery Method): room air            CONSTITUTIONAL:  Well nourished.   NAD    ENT:   Airway patent,   Mouth with normal mucosa.   No thrush      CARDIAC:   Normal rate,   Regular rhythm.    No edema      Vascular:   normal systolic impulse  no bruits    RESPIRATORY:   No wheezing  Bilateral BS   Not tachypneic,  No use of accessory muscles    GASTROINTESTINAL:  Abdomen soft,   Non-tender,   No guarding,   + BS    NEUROLOGICAL:   Alert and oriented   No motor deficits.    SKIN:   Skin normal color for race,   No evidence of rash.              22 @ 07:01  -  09-10-22 @ 07:00  --------------------------------------------------------  IN:    Oral Fluid: 236 mL  Total IN: 236 mL    OUT:    Voided (mL): 350 mL  Total OUT: 350 mL    Total NET: -114 mL          LABS:                          11.1   4.35  )-----------( 185      ( 10 Sep 2022 08:18 )             33.8                                                   138  |  103  |  25<H>  ----------------------------<  96  4.9   |  25  |  1.3    Ca    8.6      09 Sep 2022 16:50    TPro  5.8<L>  /  Alb  3.9  /  TBili  1.1  /  DBili  x   /  AST  34  /  ALT  13  /  AlkPhos  62  09-09      PT/INR - ( 10 Sep 2022 08:18 )   PT: >40.00 sec;   INR: 8.51 ratio         PTT - ( 10 Sep 2022 08:18 )  PTT:73.7 sec                                       Urinalysis Basic - ( 09 Sep 2022 17:27 )    Color: Orange / Appearance: Turbid / S.022 / pH: x  Gluc: x / Ketone: Trace  / Bili: Negative / Urobili: <2 mg/dL   Blood: x / Protein: 100 mg/dL / Nitrite: Negative   Leuk Esterase: Small / RBC: >720 /HPF / WBC 14 /HPF   Sq Epi: x / Non Sq Epi: 1 /HPF / Bacteria: Negative        CARDIAC MARKERS ( 10 Sep 2022 08:18 )  x     / x     / 272 U/L / x     / x                                                LIVER FUNCTIONS - ( 09 Sep 2022 16:50 )  Alb: 3.9 g/dL / Pro: 5.8 g/dL / ALK PHOS: 62 U/L / ALT: 13 U/L / AST: 34 U/L / GGT: x                                                                                                                                      MEDICATIONS  (STANDING):  influenza  Vaccine (HIGH DOSE) 0.7 milliLiter(s) IntraMuscular once  metoprolol succinate ER 75 milliGRAM(s) Oral daily  phytonadione   Solution 5 milliGRAM(s) Oral once    MEDICATIONS  (PRN):         Patient is a 82y old  Male who presents with a chief complaint of elevated INR (10 Sep 2022 11:25)      HPI:  Patient is 82 year old  male with history of A-Fib on Coumadin (unclear if valvular, follows Dr. Plummer), DVT and PE, left eye glaucoma s/p lens replacement, right eye blurriness who presents to the ED for INR of 8.84 on outpatient labs and bruising to his left forearm and right thigh and hematuria. He states due to a pharmacy error and given his vision trouble, he took double the dose of Coumadin for 3 weeks. Normally takes 7.5 mg on weekdays and 5.0 on weekends, has been doing so for 20+ years and gets INR checked every 3 months. Reports he was supposed to get routine INR checked in March by his cardiologist, did not check then but when he developed recent sx of bleeding and calf pain he went to check his INR and was told it was 8.84 with PTT 80s. He also took 25 mg dose yesterday on his own due to concern for DVT. His symptoms include worsening hematuria and over the past few weeks week with bruising to his left forearm and right thigh from minor bumping inside the car. He reports bilateral calf pain when standing and walking this past week none currently. ROS otherwise negative and he feels well otherwise. No melena, hematochezia, epistaxis.     In ED: vitals notable for mild tachycardia. Hgb 11.2. Plt 181. INR 15.4 PTT 88.2. Many RBC in urine. Given 2.5 mg vitamin K, ordered for 2.5 more.   T(F): 97.6 (22 @ 15:07), Max: 97.6 (22 @ 15:07)  HR: 113 (22 @ 15:07) (113 - 113)  BP: 132/59 (22 @ 15:07) (132/59 - 132/59)  RR: 20 (22 @ 15:07) (20 - 20)  SpO2: 97% (22 @ 15:07) (97% - 97%) (09 Sep 2022 19:43)      PAST MEDICAL & SURGICAL HISTORY:      SOCIAL HX:  non smoker                          FAMILY HISTORY:  :  No known cardiovacular family hisotry     Review Of Systems:     All ROS are negative except per HPI       Allergies    No Known Allergies    Intolerances          PHYSICAL EXAM    ICU Vital Signs Last 24 Hrs  T(C): 35.8 (10 Sep 2022 05:43), Max: 36.6 (10 Sep 2022 01:00)  T(F): 96.4 (10 Sep 2022 05:43), Max: 97.9 (10 Sep 2022 01:00)  HR: 75 (10 Sep 2022 05:43) (75 - 113)  BP: 123/70 (10 Sep 2022 05:43) (102/69 - 132/59)  BP(mean): --  ABP: --  ABP(mean): --  RR: 18 (10 Sep 2022 05:) (18 - 20)  SpO2: 98% (10 Sep 2022 05:43) (97% - 99%)    O2 Parameters below as of 10 Sep 2022 05:43  Patient On (Oxygen Delivery Method): room air            CONSTITUTIONAL:  Well nourished.   NAD    ENT:   Airway patent,   Mouth with normal mucosa.   No thrush      CARDIAC:   rate controlled  no murmur heard    Vascular:   normal systolic impulse  no bruits    RESPIRATORY:   No wheezing  Bilateral BS   Not tachypneic,  No use of accessory muscles    GASTROINTESTINAL:  Abdomen soft,   Non-tender,   No guarding,   + BS    NEUROLOGICAL:   Alert and oriented   No motor deficits.    SKIN:   Skin normal color for race,   No evidence of rash.              22 @ 07:01  -  09-10-22 @ 07:00  --------------------------------------------------------  IN:    Oral Fluid: 236 mL  Total IN: 236 mL    OUT:    Voided (mL): 350 mL  Total OUT: 350 mL    Total NET: -114 mL          LABS:                          11.1   4.35  )-----------( 185      ( 10 Sep 2022 08:18 )             33.8                                                   138  |  103  |  25<H>  ----------------------------<  96  4.9   |  25  |  1.3    Ca    8.6      09 Sep 2022 16:50    TPro  5.8<L>  /  Alb  3.9  /  TBili  1.1  /  DBili  x   /  AST  34  /  ALT  13  /  AlkPhos  62  09-09      PT/INR - ( 10 Sep 2022 08:18 )   PT: >40.00 sec;   INR: 8.51 ratio         PTT - ( 10 Sep 2022 08:18 )  PTT:73.7 sec                                       Urinalysis Basic - ( 09 Sep 2022 17:27 )    Color: Orange / Appearance: Turbid / S.022 / pH: x  Gluc: x / Ketone: Trace  / Bili: Negative / Urobili: <2 mg/dL   Blood: x / Protein: 100 mg/dL / Nitrite: Negative   Leuk Esterase: Small / RBC: >720 /HPF / WBC 14 /HPF   Sq Epi: x / Non Sq Epi: 1 /HPF / Bacteria: Negative        CARDIAC MARKERS ( 10 Sep 2022 08:18 )  x     / x     / 272 U/L / x     / x                                                LIVER FUNCTIONS - ( 09 Sep 2022 16:50 )  Alb: 3.9 g/dL / Pro: 5.8 g/dL / ALK PHOS: 62 U/L / ALT: 13 U/L / AST: 34 U/L / GGT: x                                                                                                                                      MEDICATIONS  (STANDING):  influenza  Vaccine (HIGH DOSE) 0.7 milliLiter(s) IntraMuscular once  metoprolol succinate ER 75 milliGRAM(s) Oral daily  phytonadione   Solution 5 milliGRAM(s) Oral once    MEDICATIONS  (PRN):

## 2022-09-10 NOTE — PROGRESS NOTE ADULT - ATTENDING COMMENTS
Patient is 82 year old  male with history of A-Fib on Coumadin (unclear if valvular, follows Dr. Plummer), DVT and PE, left eye glaucoma s/p lens replacement, right eye blurriness who presents to the ED for INR of 8.84 on outpatient labs, presenting here with hematuria and bruising, found to have INR 15 on admission.     According to patient, he was taking a double dose of coumadin because the pharmacist filled his metoprolol bottle with warfarin, confirmed later on with the pharmacist after reading the serial numbers on the pills.     #Supratherapeutic INR due to Coumadin Toxicity  #Hematuria   - as stated above pt was taking double dose of warfarin because his metoprolol bottle was in fact filled with coumadin.   -+hematuria and bruising, no other evidence of bleed  - s/p 5mg PO Vitamin K in ED   - INR 15.46--> 8.51 today   - continues to have hematuria   - will provide additional dose of Vitamin K 5mg today and monitor bleeding episodes closely   - monitor hemoglobin closely , active T&S  - refer to Coumadin clinic on discharge, pt does not want any other form of AC     #Chronic AFib on Coumadin  -hold Coumadin  -continue Metoprolol 75 QD     #Hx DVT and PE  -hold Coumadin for now due to above    #Hx Left eye glaucoma s/p lens replacement- clarify meds with pt     #Progress Note Handoff:  Pending (specify): pending improvement in INR and hematuria   Family discussion: d/w pt   Disposition: Home___/SNF___/Other________/Unknown at this time____x____    Total time spent to complete patient's bedside assessment, review medical chart, discuss medical plan of care with covering medical team was more than 35 minutes  with >50% of time spent face to face with patient, discussion with patient/family and/or coordination of care      Lolis Moyer,

## 2022-09-10 NOTE — PROGRESS NOTE ADULT - SUBJECTIVE AND OBJECTIVE BOX
MICHELINE KEITH 82y Male  MRN#: [id number]   Hospital Day: 1d    HPI:  Patient is 82 year old  male with history of A-Fib on Coumadin (unclear if valvular, follows Dr. Plummer), DVT and PE, left eye glaucoma s/p lens replacement, right eye blurriness who presents to the ED for INR of 8.84 on outpatient labs and bruising to his left forearm and right thigh and hematuria. He states due to a pharmacy error and given his vision trouble, he took double the dose of Coumadin for 3 weeks. Normally takes 7.5 mg on weekdays and 5.0 on weekends, has been doing so for 20+ years and gets INR checked every 3 months. Reports he was supposed to get routine INR checked in March by his cardiologist, did not check then but when he developed recent sx of bleeding and calf pain he went to check his INR and was told it was 8.84 with PTT 80s. He also took 25 mg dose yesterday on his own due to concern for DVT. His symptoms include worsening hematuria and over the past few weeks week with bruising to his left forearm and right thigh from minor bumping inside the car. He reports bilateral calf pain when standing and walking this past week none currently. ROS otherwise negative and he feels well otherwise. No melena, hematochezia, epistaxis.     In ED: vitals notable for mild tachycardia. Hgb 11.2. Plt 181. INR 15.4 PTT 88.2. Many RBC in urine. Given 2.5 mg vitamin K, ordered for 2.5 more.   T(F): 97.6 (22 @ 15:07), Max: 97.6 (22 @ 15:07)  HR: 113 (22 @ 15:07) (113 - 113)  BP: 132/59 (22 @ 15:07) (132/59 - 132/59)  RR: 20 (22 @ 15:07) (20 - 20)  SpO2: 97% (22 @ 15:07) (97% - 97%) (09 Sep 2022 19:43)      SUBJECTIVE  Patient is a 82y old Male who presents with a chief complaint of elevated INR (09 Sep 2022 19:43)  Currently admitted to medicine with the primary diagnosis of Coumadin toxicity      INTERVAL HPI AND OVERNIGHT EVENTS:  Patient was examined and seen at bedside. This morning he is resting comfortably in bed and reports no issues or overnight events. He's still having hematuria.     REVIEW OF SYMPTOMS:  CONSTITUTIONAL: No weakness, fevers or chills; No headaches  EYES: No visual changes, eye pain, or discharge  ENT: No vertigo; No ear pain or change in hearing; No sore throat or difficulty swallowing  NECK: No pain or stiffness  RESPIRATORY: No cough, wheezing, or hemoptysis; No shortness of breath  CARDIOVASCULAR: No chest pain or palpitations  GASTROINTESTINAL: No abdominal or epigastric pain; No nausea, vomiting, or hematemesis; No diarrhea or constipation; No melena or hematochezia  GENITOURINARY: No dysuria, frequency or hematuria  MUSCULOSKELETAL: No joint pain, no muscle pain, no weakness  NEUROLOGICAL: No numbness or weakness  SKIN: No itching or rashes    OBJECTIVE  PAST MEDICAL & SURGICAL HISTORY    ALLERGIES:  No Known Allergies    MEDICATIONS:  STANDING MEDICATIONS  influenza  Vaccine (HIGH DOSE) 0.7 milliLiter(s) IntraMuscular once  metoprolol succinate ER 75 milliGRAM(s) Oral daily  phytonadione   Solution 5 milliGRAM(s) Oral once    PRN MEDICATIONS      VITAL SIGNS: Last 24 Hours  T(C): 35.8 (10 Sep 2022 05:43), Max: 36.6 (10 Sep 2022 01:00)  T(F): 96.4 (10 Sep 2022 05:43), Max: 97.9 (10 Sep 2022 01:00)  HR: 75 (10 Sep 2022 05:43) (75 - 113)  BP: 123/70 (10 Sep 2022 05:43) (102/69 - 132/59)  BP(mean): --  RR: 18 (10 Sep 2022 05:43) (18 - 20)  SpO2: 98% (10 Sep 2022 05:43) (97% - 99%)    LABS:                        11.1   4.35  )-----------( 185      ( 10 Sep 2022 08:18 )             33.8     -    138  |  103  |  25<H>  ----------------------------<  96  4.9   |  25  |  1.3    Ca    8.6      09 Sep 2022 16:50    TPro  5.8<L>  /  Alb  3.9  /  TBili  1.1  /  DBili  x   /  AST  34  /  ALT  13  /  AlkPhos  62      PT/INR - ( 10 Sep 2022 08:18 )   PT: >40.00 sec;   INR: 8.51 ratio         PTT - ( 10 Sep 2022 08:18 )  PTT:73.7 sec  Urinalysis Basic - ( 09 Sep 2022 17:27 )    Color: Orange / Appearance: Turbid / S.022 / pH: x  Gluc: x / Ketone: Trace  / Bili: Negative / Urobili: <2 mg/dL   Blood: x / Protein: 100 mg/dL / Nitrite: Negative   Leuk Esterase: Small / RBC: >720 /HPF / WBC 14 /HPF   Sq Epi: x / Non Sq Epi: 1 /HPF / Bacteria: Negative        Creatine Kinase, Serum: 272 U/L *H* (09-10-22 @ 08:18)      CARDIAC MARKERS ( 10 Sep 2022 08:18 )  x     / x     / 272 U/L / x     / x          PHYSICAL EXAM:  CONSTITUTIONAL: No acute distress, well-developed, well-groomed, AAOx3  HEAD: Atraumatic, normocephalic  EYES: EOM intact, PERRLA, conjunctiva and sclera clear  ENT: Supple, no masses, no thyromegaly, no bruits, no JVD; moist mucous membranes  PULMONARY: Clear to auscultation bilaterally; no wheezes, rales, or rhonchi  CARDIOVASCULAR: Regular rate and rhythm; no murmurs, rubs, or gallops  GASTROINTESTINAL: Soft, non-tender, non-distended; bowel sounds present  MUSCULOSKELETAL: 2+ peripheral pulses; no clubbing, no cyanosis, no edema  NEUROLOGY: non-focal  SKIN: bruise on left upper arm and right thigh, non painful, non pulsatile     ASSESSMENT & PLAN  Patient is 82 year old  male with history of A-Fib on Coumadin (unclear if valvular, follows Dr. Plummer), DVT and PE, left eye glaucoma s/p lens replacement, right eye blurriness who presents to the ED for INR of 8.84 on outpatient labs and bruising to his left forearm and right thigh and hematuria.  Found to have INR of 15 here.    #Supratherapeutic INR due to Coumadin Toxicity  -on Coumadin 7.5 weekdays and 5.0 weekends - took double dose for 3 weeks and 25 mg yesterday  -+hematuria and bruising, no other evidence of bleed  -sp pulm eval in ED- not candidate for ICU at this time. No need for FFPs  -give 5 mg Vitamin K in ED   - active T&S  - Hb stable   - INR trending down 8.51< 13.24< 15.46   - given another 5 mg vitamin K (9/10/2022 )  -refer to Coumadin clinic on discharge once INR is okay. pt appears he is comfortable with Coumadin and does not want alternative anticoagulation  ( try to touch base with Dr Plummer on Monday to check if it's valvular afib or not to see if he can be switched to NOACS )  -low threshold for upgrade to ICU if pt actively bleeds or becomes hemodynamically unstable     #Hematuria due to Coumadin toxicity   -hold Coumadin  -monitor    #Chronic AFib on Coumadin  -hold Coumadin  -continue Metoprolol 75 QD   -check EKG if HR persistently >110 and A-Fib, monitor on telemetry ( today HR controlled 75)     #Bilateral calf pain - resolving  -doubt DVT given bilateral nature and supratherapeutic INR  -upon movement suggests possibly arterial, however has distal pulses and warmth   -if recurs, would check arterial and venous duplex  -      #Hx DVT and PE  -hold Coumadin for now due to above    #Hx Left eye glaucoma s/p lens repalcement  -on latanoprost? at home - please verify with wife as pt unsure    DVT prophylaxis - SCD, held due to above  Diet - DASH/TLC  Activity - IAT  Code Status - Full - discussed with patient.

## 2022-09-10 NOTE — CONSULT NOTE ADULT - ATTENDING COMMENTS
Patient without clinically significant bleeding, now s/p vitamin K and substantial decrease in INR.  No role for critical care services at this time.    I agree with the fellow note, with the exceptions listed in my attestation above.  The remainder of impression and plan per fellow note.

## 2022-09-11 ENCOUNTER — TRANSCRIPTION ENCOUNTER (OUTPATIENT)
Age: 82
End: 2022-09-11

## 2022-09-11 VITALS
TEMPERATURE: 98 F | HEART RATE: 95 BPM | RESPIRATION RATE: 19 BRPM | SYSTOLIC BLOOD PRESSURE: 105 MMHG | DIASTOLIC BLOOD PRESSURE: 68 MMHG

## 2022-09-11 LAB
ALBUMIN SERPL ELPH-MCNC: 3.7 G/DL — SIGNIFICANT CHANGE UP (ref 3.5–5.2)
ALP SERPL-CCNC: 61 U/L — SIGNIFICANT CHANGE UP (ref 30–115)
ALT FLD-CCNC: 12 U/L — SIGNIFICANT CHANGE UP (ref 0–41)
ANION GAP SERPL CALC-SCNC: 9 MMOL/L — SIGNIFICANT CHANGE UP (ref 7–14)
APTT BLD: 34.8 SEC — SIGNIFICANT CHANGE UP (ref 27–39.2)
AST SERPL-CCNC: 22 U/L — SIGNIFICANT CHANGE UP (ref 0–41)
BASOPHILS # BLD AUTO: 0.02 K/UL — SIGNIFICANT CHANGE UP (ref 0–0.2)
BASOPHILS NFR BLD AUTO: 0.3 % — SIGNIFICANT CHANGE UP (ref 0–1)
BILIRUB SERPL-MCNC: 1.9 MG/DL — HIGH (ref 0.2–1.2)
BLD GP AB SCN SERPL QL: SIGNIFICANT CHANGE UP
BUN SERPL-MCNC: 20 MG/DL — SIGNIFICANT CHANGE UP (ref 10–20)
CALCIUM SERPL-MCNC: 8.4 MG/DL — LOW (ref 8.5–10.1)
CHLORIDE SERPL-SCNC: 105 MMOL/L — SIGNIFICANT CHANGE UP (ref 98–110)
CO2 SERPL-SCNC: 26 MMOL/L — SIGNIFICANT CHANGE UP (ref 17–32)
CREAT SERPL-MCNC: 1.1 MG/DL — SIGNIFICANT CHANGE UP (ref 0.7–1.5)
CULTURE RESULTS: SIGNIFICANT CHANGE UP
EGFR: 68 ML/MIN/1.73M2 — SIGNIFICANT CHANGE UP
EOSINOPHIL # BLD AUTO: 0.13 K/UL — SIGNIFICANT CHANGE UP (ref 0–0.7)
EOSINOPHIL NFR BLD AUTO: 2.2 % — SIGNIFICANT CHANGE UP (ref 0–8)
GLUCOSE SERPL-MCNC: 109 MG/DL — HIGH (ref 70–99)
HCT VFR BLD CALC: 35 % — LOW (ref 42–52)
HGB BLD-MCNC: 11.8 G/DL — LOW (ref 14–18)
IMM GRANULOCYTES NFR BLD AUTO: 0.3 % — SIGNIFICANT CHANGE UP (ref 0.1–0.3)
INR BLD: 1.87 RATIO — HIGH (ref 0.65–1.3)
LYMPHOCYTES # BLD AUTO: 1.03 K/UL — LOW (ref 1.2–3.4)
LYMPHOCYTES # BLD AUTO: 17.6 % — LOW (ref 20.5–51.1)
MCHC RBC-ENTMCNC: 32.8 PG — HIGH (ref 27–31)
MCHC RBC-ENTMCNC: 33.7 G/DL — SIGNIFICANT CHANGE UP (ref 32–37)
MCV RBC AUTO: 97.2 FL — HIGH (ref 80–94)
MONOCYTES # BLD AUTO: 0.71 K/UL — HIGH (ref 0.1–0.6)
MONOCYTES NFR BLD AUTO: 12.1 % — HIGH (ref 1.7–9.3)
NEUTROPHILS # BLD AUTO: 3.94 K/UL — SIGNIFICANT CHANGE UP (ref 1.4–6.5)
NEUTROPHILS NFR BLD AUTO: 67.5 % — SIGNIFICANT CHANGE UP (ref 42.2–75.2)
NRBC # BLD: 0 /100 WBCS — SIGNIFICANT CHANGE UP (ref 0–0)
PLATELET # BLD AUTO: 203 K/UL — SIGNIFICANT CHANGE UP (ref 130–400)
POTASSIUM SERPL-MCNC: 4.2 MMOL/L — SIGNIFICANT CHANGE UP (ref 3.5–5)
POTASSIUM SERPL-SCNC: 4.2 MMOL/L — SIGNIFICANT CHANGE UP (ref 3.5–5)
PROT SERPL-MCNC: 5.7 G/DL — LOW (ref 6–8)
PROTHROM AB SERPL-ACNC: 21.4 SEC — HIGH (ref 9.95–12.87)
RBC # BLD: 3.6 M/UL — LOW (ref 4.7–6.1)
RBC # FLD: 13.2 % — SIGNIFICANT CHANGE UP (ref 11.5–14.5)
SODIUM SERPL-SCNC: 140 MMOL/L — SIGNIFICANT CHANGE UP (ref 135–146)
SPECIMEN SOURCE: SIGNIFICANT CHANGE UP
WBC # BLD: 5.85 K/UL — SIGNIFICANT CHANGE UP (ref 4.8–10.8)
WBC # FLD AUTO: 5.85 K/UL — SIGNIFICANT CHANGE UP (ref 4.8–10.8)

## 2022-09-11 PROCEDURE — 99239 HOSP IP/OBS DSCHRG MGMT >30: CPT

## 2022-09-11 RX ADMIN — Medication 75 MILLIGRAM(S): at 06:22

## 2022-09-11 NOTE — DISCHARGE NOTE NURSING/CASE MANAGEMENT/SOCIAL WORK - NSDCPEFALRISK_GEN_ALL_CORE
For information on Fall & Injury Prevention, visit: [email or web address]  [email or web address]  https://www.cdc.gov/steadi/patient.html

## 2022-09-11 NOTE — DISCHARGE NOTE PROVIDER - NSDCMRMEDTOKEN_GEN_ALL_CORE_FT
Coumadin 5 mg oral tablet: 1 tab(s) orally once a day on weekends  Coumadin 7.5 mg oral tablet: 1 tab(s) orally once a day monday to friday  Metoprolol Succinate ER 25 mg oral tablet, extended release: 3 tab(s) orally once a day

## 2022-09-11 NOTE — CHART NOTE - NSCHARTNOTEFT_GEN_A_CORE
Called by RN for temp 100.5   Ordered tylenol and blood cultures   reassessed 2 hours later---> patient euthermic and NAD   Blood cultures collected.   Will sign out to day team,

## 2022-09-11 NOTE — DISCHARGE NOTE PROVIDER - CARE PROVIDER_API CALL
Yari Mcrae)  Cardiovascular Disease; Nuclear Cardiology  [address]  [place] [address]  Phone: [phone]  Fax: [phone]  Follow Up Time: 1-3 days

## 2022-09-11 NOTE — DISCHARGE NOTE NURSING/CASE MANAGEMENT/SOCIAL WORK - PATIENT PORTAL LINK FT
You can access the FollowMyHealth Patient Portal offered by [facility] by registering at the following website: [email or web address]. By joining [employer]’s FollowMyHealth portal, you will also be able to view your health information using other applications (apps) compatible with our system.

## 2022-09-11 NOTE — DISCHARGE NOTE PROVIDER - ATTENDING DISCHARGE PHYSICAL EXAMINATION:
CONSTITUTIONAL: No acute distress, well-developed, well-groomed, AAOx3  HEAD: Atraumatic, normocephalic  EYES: EOM intact, PERRLA, conjunctiva and sclera clear  ENT: Supple, no masses, no thyromegaly, no bruits, no JVD; moist mucous membranes  PULMONARY: Clear to auscultation bilaterally; no wheezes, rales, or rhonchi  CARDIOVASCULAR: Regular rate and rhythm; no murmurs, rubs, or gallops  GASTROINTESTINAL: Soft, non-tender, non-distended; bowel sounds present  MUSCULOSKELETAL: 2+ peripheral pulses; no clubbing, no cyanosis, no edema. L thumb with some swelling and bruising, but normal ROM no erythema/warmth/pain   NEUROLOGY: non-focal  SKIN: bruise on left upper arm and right thigh, non painful, non pulsatile

## 2022-09-11 NOTE — PROGRESS NOTE ADULT - SUBJECTIVE AND OBJECTIVE BOX
MICHELINE KEITH 82y Male  MRN#: [id number]   CODE STATUS:________    Hospital Day: 2d    Pt is currently admitted with the primary diagnosis of coumadin toxicity. Patient found to have a fever overnight. Still with hematuria however nursing staff overnight reported that this process is resolving. At the time of fever tylenol was given with good effect and blood cultures drawn.      Present Today:   - Connolly:  No [  ], Yes [   ] : Indication:     - Type of IV Access:       .. CVC/Piccline:  No [  ], Yes [   ] : Indication:       .. Midline: No [  ], Yes [   ] : Indication:                                              OBJECTIVE  PAST MEDICAL & SURGICAL HISTORY                                              ALLERGIES:  No Known Allergies                           HOME MEDICATIONS  Home Medications:  Coumadin 5 mg oral tablet: 1 tab(s) orally once a day on weekends (09 Sep 2022 20:04)  Coumadin 7.5 mg oral tablet: 1 tab(s) orally once a day monday to friday (09 Sep 2022 20:04)  Metoprolol Succinate ER 25 mg oral tablet, extended release: 3 tab(s) orally once a day (09 Sep 2022 20:04)                           MEDICATIONS:  STANDING MEDICATIONS  influenza  Vaccine (HIGH DOSE) 0.7 milliLiter(s) IntraMuscular once  metoprolol succinate ER 75 milliGRAM(s) Oral daily    PRN MEDICATIONS  acetaminophen     Tablet .. 650 milliGRAM(s) Oral every 6 hours PRN                                            ------------------------------------------------------------  VITAL SIGNS: Last 24 Hours  T(C): 36.4 (11 Sep 2022 08:58), Max: 38.8 (10 Sep 2022 20:39)  T(F): 97.5 (11 Sep 2022 08:58), Max: 101.9 (10 Sep 2022 20:39)  HR: 95 (11 Sep 2022 08:58) (95 - 121)  BP: 105/68 (11 Sep 2022 08:58) (105/68 - 126/71)  BP(mean): --  RR: 19 (11 Sep 2022 08:58) (18 - 20)  SpO2: 98% (10 Sep 2022 14:00) (98% - 98%)                                               LABS:                        11.1   4.35  )-----------( 185      ( 10 Sep 2022 08:18 )             33.8         138  |  103  |  25<H>  ----------------------------<  96  4.9   |  25  |  1.3    Ca    8.6      09 Sep 2022 16:50    TPro  5.8<L>  /  Alb  3.9  /  TBili  1.1  /  DBili  x   /  AST  34  /  ALT  13  /  AlkPhos  62  -    PT/INR - ( 10 Sep 2022 08:18 )   PT: >40.00 sec;   INR: 8.51 ratio         PTT - ( 10 Sep 2022 08:18 )  PTT:73.7 sec  Urinalysis Basic - ( 09 Sep 2022 17:27 )    Color: Orange / Appearance: Turbid / S.022 / pH: x  Gluc: x / Ketone: Trace  / Bili: Negative / Urobili: <2 mg/dL   Blood: x / Protein: 100 mg/dL / Nitrite: Negative   Leuk Esterase: Small / RBC: >720 /HPF / WBC 14 /HPF   Sq Epi: x / Non Sq Epi: 1 /HPF / Bacteria: Negative              Culture - Urine (collected 09 Sep 2022 17:27)  Source: Clean Catch Clean Catch (Midstream)  Final Report (11 Sep 2022 00:36):    <10,000 CFU/mL Normal Urogenital Dali        CARDIAC MARKERS ( 10 Sep 2022 08:18 )  x     / x     / 272 U/L / x     / x                                                  RADIOLOGY:        PHYSICAL EXAM:  GENERAL: NAD, lying in bed comfortably  HEAD:  Atraumatic, Normocephalic  EYES: EOMI, PERRLA, conjunctiva and sclera clear  ENT: Moist mucous membranes  NECK: Supple, No JVD  CHEST/LUNG: Clear to auscultation bilaterally; No rales, rhonchi, wheezing, or rubs. Unlabored respirations  HEART: Regular rate and rhythm; No murmurs, rubs, or gallops  ABDOMEN: BSx4; Soft, nontender, nondistended  EXTREMITIES:  2+ Peripheral Pulses, brisk capillary refill. No clubbing, cyanosis, or edema  NERVOUS SYSTEM:  A&Ox3, no focal deficits   SKIN: No rashes or lesions                                         ASSESSMENT & PLAN    Patient is 82 year old  male with history of A-Fib on Coumadin (unclear if valvular, follows Dr. Plummer), DVT and PE, left eye glaucoma s/p lens replacement, right eye blurriness who presents to the ED for INR of 8.84 on outpatient labs and bruising to his left forearm and right thigh and hematuria.  Found to have INR of 15 here.    #Supratherapeutic INR due to Coumadin Toxicity  -on Coumadin 7.5 weekdays and 5.0 weekends - took double dose for 3 weeks and 25 mg two days prior   -+hematuria and bruising, no other evidence of bleed  -sp pulm eval in ED- not candidate for ICU at this time. No need for FFPs  -give 5 mg Vitamin K in ED   - active T&S  - Hb stable   - INR trending down 8.51< 13.24< 15.46   - given another 5 mg vitamin K (9/10/2022 )  -refer to Coumadin clinic on discharge once INR is okay. pt appears he is comfortable with Coumadin and does not want alternative anticoagulation  ( try to touch base with Dr Plummer on Monday to check if it's valvular afib or not to see if he can be switched to NOACS )  -low threshold for upgrade to ICU if pt actively bleeds or becomes hemodynamically unstable     #Hematuria due to Coumadin toxicity   -hold Coumadin  -monitor    #Chronic AFib on Coumadin  -hold Coumadin  -continue Metoprolol 75 QD   -check EKG if HR persistently >110 and A-Fib, monitor on telemetry ( today HR controlled 75)     #Bilateral calf pain - resolving  -doubt DVT given bilateral nature and supratherapeutic INR  -upon movement suggests possibly arterial, however has distal pulses and warmth   -if recurs, would check arterial and venous duplex  -      #Hx DVT and PE  -hold Coumadin for now due to above    #Hx Left eye glaucoma s/p lens repalcement  -on latanoprost? at home - please verify with wife as pt unsure    DVT prophylaxis - SCD, held due to above  Diet - DASH/TLC  Activity - IAT  Code Status - Full - discussed with patient.                      MICHELINE KEITH 82y Male  MRN#: [id number]   CODE STATUS:________    Hospital Day: 2d    Pt is currently admitted with the primary diagnosis of coumadin toxicity. Patient found to have a fever overnight. Still with hematuria however nursing staff overnight reported that this process is resolving. At the time of fever tylenol was given with good effect and blood cultures drawn.                                             OBJECTIVE  PAST MEDICAL & SURGICAL HISTORY                                              ALLERGIES:  No Known Allergies                           HOME MEDICATIONS  Home Medications:  Coumadin 5 mg oral tablet: 1 tab(s) orally once a day on weekends (09 Sep 2022 20:04)  Coumadin 7.5 mg oral tablet: 1 tab(s) orally once a day monday to friday (09 Sep 2022 20:04)  Metoprolol Succinate ER 25 mg oral tablet, extended release: 3 tab(s) orally once a day (09 Sep 2022 20:04)                           MEDICATIONS:  STANDING MEDICATIONS  influenza  Vaccine (HIGH DOSE) 0.7 milliLiter(s) IntraMuscular once  metoprolol succinate ER 75 milliGRAM(s) Oral daily    PRN MEDICATIONS  acetaminophen     Tablet .. 650 milliGRAM(s) Oral every 6 hours PRN                                            ------------------------------------------------------------  VITAL SIGNS: Last 24 Hours  T(C): 36.4 (11 Sep 2022 08:58), Max: 38.8 (10 Sep 2022 20:39)  T(F): 97.5 (11 Sep 2022 08:58), Max: 101.9 (10 Sep 2022 20:39)  HR: 95 (11 Sep 2022 08:58) (95 - 121)  BP: 105/68 (11 Sep 2022 08:58) (105/68 - 126/71)  BP(mean): --  RR: 19 (11 Sep 2022 08:58) (18 - 20)  SpO2: 98% (10 Sep 2022 14:00) (98% - 98%)                                               LABS:                        11.1   4.35  )-----------( 185      ( 10 Sep 2022 08:18 )             33.8     -    138  |  103  |  25<H>  ----------------------------<  96  4.9   |  25  |  1.3    Ca    8.6      09 Sep 2022 16:50    TPro  5.8<L>  /  Alb  3.9  /  TBili  1.1  /  DBili  x   /  AST  34  /  ALT  13  /  AlkPhos  62  09-09    PT/INR - ( 10 Sep 2022 08:18 )   PT: >40.00 sec;   INR: 8.51 ratio         PTT - ( 10 Sep 2022 08:18 )  PTT:73.7 sec  Urinalysis Basic - ( 09 Sep 2022 17:27 )    Color: Orange / Appearance: Turbid / S.022 / pH: x  Gluc: x / Ketone: Trace  / Bili: Negative / Urobili: <2 mg/dL   Blood: x / Protein: 100 mg/dL / Nitrite: Negative   Leuk Esterase: Small / RBC: >720 /HPF / WBC 14 /HPF   Sq Epi: x / Non Sq Epi: 1 /HPF / Bacteria: Negative              Culture - Urine (collected 09 Sep 2022 17:27)  Source: Clean Catch Clean Catch (Midstream)  Final Report (11 Sep 2022 00:36):    <10,000 CFU/mL Normal Urogenital Dali        CARDIAC MARKERS ( 10 Sep 2022 08:18 )  x     / x     / 272 U/L / x     / x                                                  RADIOLOGY:      ASSESSMENT & PLAN    Patient is 82 year old  male with history of A-Fib on Coumadin (unclear if valvular, follows Dr. Plummer), DVT and PE, left eye glaucoma s/p lens replacement, right eye blurriness who presents to the ED for INR of 8.84 on outpatient labs and bruising to his left forearm and right thigh and hematuria.  Found to have INR of 15 here.    #Supratherapeutic INR due to Coumadin Toxicity  -on Coumadin 7.5 weekdays and 5.0 weekends - took double dose for 3 weeks and 25 mg two days prior   -+hematuria and bruising, no other evidence of bleed  -sp pulm eval in ED- not candidate for ICU at this time. No need for FFPs  -give 5 mg Vitamin K in ED   - active T&S  - Hb stable   - INR trending down 8.51< 13.24< 15.46   - given another 5 mg vitamin K (9/10/2022 )  -refer to Coumadin clinic on discharge once INR is okay. pt appears he is comfortable with Coumadin and does not want alternative anticoagulation  ( try to touch base with Dr Plummer on Monday to check if it's valvular afib or not to see if he can be switched to NOACS )  -low threshold for upgrade to ICU if pt actively bleeds or becomes hemodynamically unstable     #Hematuria due to Coumadin toxicity   -hold Coumadin  -monitor    #Chronic AFib on Coumadin  -hold Coumadin  -continue Metoprolol 75 QD   -check EKG if HR persistently >110 and A-Fib, monitor on telemetry ( today HR controlled 75)     #Bilateral calf pain - resolving  -doubt DVT given bilateral nature and supratherapeutic INR  -upon movement suggests possibly arterial, however has distal pulses and warmth   -if recurs, would check arterial and venous duplex  -      #Hx DVT and PE  -hold Coumadin for now due to above      DVT prophylaxis - SCD, held due to above  Diet - DASH/TLC  Activity - IAT  Code Status - Full - discussed with patient.

## 2022-09-11 NOTE — DISCHARGE NOTE NURSING/CASE MANAGEMENT/SOCIAL WORK - NSDCPEPTCOWAR_GEN_ALL_CORE
Warfarin/Coumadin - Compliance/Warfarin/Coumadin - Dietary Advice/Warfarin/Coumadin - Follow up monitoring/Warfarin/Coumadin - Potential for adverse drug reactions and interactions
Vaginal Leakage

## 2022-09-11 NOTE — DISCHARGE NOTE PROVIDER - HOSPITAL COURSE
Patient is 82 year old  male with history of A-Fib on Coumadin (unclear if valvular, follows Dr. Mcrae), DVT and PE, left eye glaucoma s/p lens replacement, right eye blurriness who presents to the ED for INR of 8.84 on outpatient labs, presenting here with hematuria and bruising, found to have INR 15 on admission.     According to patient, he was taking a double dose of coumadin because the pharmacist filled his metoprolol bottle with warfarin, confirmed later on with the pharmacist after reading the serial numbers on the pills.     #Supratherapeutic INR due to Coumadin Toxicity  #Hematuria   - as stated above pt was taking double dose of warfarin because his metoprolol bottle was in fact filled with coumadin.   -+hematuria and bruising, no other evidence of bleed- since resolved   - s/p 5mg PO Vitamin K x2 doses   - INR 15.46--> 8.51 --> 1.87  - ok to resume coumadin, and follow up with Dr. Mcrae for INR monitoring     #Chronic AFib on Coumadin  #Hx DVT and PE  -continue Metoprolol 75 QD and Coumadin as prescribed               Patient is 82 year old  male with history of A-Fib on Coumadin (unclear if valvular, follows Dr. Mcrae), DVT and PE, left eye glaucoma s/p lens replacement, right eye blurriness who presents to the ED for INR of 8.84 on outpatient labs, presenting here with hematuria and bruising, found to have INR 15 on admission.     According to patient, he was taking a double dose of coumadin because the pharmacist filled his metoprolol bottle with warfarin, confirmed later on with the pharmacist after reading the serial numbers on the pills.     #Supratherapeutic INR due to Coumadin Toxicity  #Hematuria   - as stated above pt was taking double dose of warfarin because his metoprolol bottle was in fact filled with coumadin.   -+hematuria and bruising, no other evidence of bleed- since resolved   - s/p 5mg PO Vitamin K x2 doses   - INR 15.46--> 8.51 --> 1.87  - ok to resume coumadin, and follow up with Dr. Mcrae for INR monitoring     #Chronic AFib on Coumadin  #Hx DVT and PE  -continue Metoprolol 75 QD and Coumadin as prescribed    #Fever x1   - pt with a 1x fever of 101.9 but normothermic immediately before and after, pt does not remember being febrile or having chills, completely asymptomatic. Likely an error value. Pt to see a physician if fevers recur.

## 2022-09-11 NOTE — DISCHARGE NOTE PROVIDER - NSDCCPCAREPLAN_GEN_ALL_CORE_FT
PRINCIPAL DISCHARGE DIAGNOSIS  Diagnosis: Coumadin toxicity  Assessment and Plan of Treatment: - your INR levels were elevated because your Metoprolol bottle was filled with Warfarin therefore you were taking too much Warfarin. Please make sure the Pharmacy is aware of this error as this is very dangerous.   - Please continue your Warfarin as previously prescribed, and follow up with Dr. Mcrae       PRINCIPAL DISCHARGE DIAGNOSIS  Diagnosis: Coumadin toxicity  Assessment and Plan of Treatment: - your INR levels were elevated because your Metoprolol bottle was filled with Warfarin therefore you were taking too much Warfarin. Please make sure the Pharmacy is aware of this error as this is very dangerous.   - Please continue your Warfarin as previously prescribed, and follow up with Dr. Mcrae      SECONDARY DISCHARGE DIAGNOSES  Diagnosis: Fever  Assessment and Plan of Treatment: - you had a one time documented fever from last night but your temperature prior to and after were normal. You deny any symptoms this morning. I think this temperature reading may have been an error so I am ok with discharge, but if you notice fevers at home please see a doctor for evaluation.

## 2022-09-15 DIAGNOSIS — T45.511A POISONING BY ANTICOAGULANTS, ACCIDENTAL (UNINTENTIONAL), INITIAL ENCOUNTER: ICD-10-CM

## 2022-09-15 DIAGNOSIS — R79.1 ABNORMAL COAGULATION PROFILE: ICD-10-CM

## 2022-09-15 DIAGNOSIS — Z79.01 LONG TERM (CURRENT) USE OF ANTICOAGULANTS: ICD-10-CM
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
## 2022-09-15 DIAGNOSIS — Z86.718 PERSONAL HISTORY OF OTHER VENOUS THROMBOSIS AND EMBOLISM: ICD-10-CM

## 2022-09-15 DIAGNOSIS — I48.20 CHRONIC ATRIAL FIBRILLATION, UNSPECIFIED: ICD-10-CM

## 2022-09-15 DIAGNOSIS — M79.661 PAIN IN RIGHT LOWER LEG: ICD-10-CM

## 2022-09-15 DIAGNOSIS — R31.9 HEMATURIA, UNSPECIFIED: ICD-10-CM

## 2022-09-15 DIAGNOSIS — Z86.711 PERSONAL HISTORY OF PULMONARY EMBOLISM: ICD-10-CM
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
## 2022-09-15 DIAGNOSIS — H40.9 UNSPECIFIED GLAUCOMA: ICD-10-CM

## 2022-09-15 DIAGNOSIS — M79.662 PAIN IN LEFT LOWER LEG: ICD-10-CM

## 2022-09-16 LAB
CULTURE RESULTS: SIGNIFICANT CHANGE UP
SPECIMEN SOURCE: SIGNIFICANT CHANGE UP

## 2024-11-22 NOTE — ED ADULT NURSE NOTE - NURSING GU BLADDER
Goal Outcome Evaluation:   NAEO. NPO for surgery.                                          non-distended

## 2025-05-14 NOTE — DISCHARGE NOTE PROVIDER - NSCORESITESY/N_GEN_A_CORE_RD
Patient Discharge Instructions  Surgery Patient Discharge Instructions    RESUME ACTIVITY:     WOUND CARE:   Do not remove top dressing for 24 hrs. If the top dressing falls off or you have a bowel movement, please wash the perianal region with gentle soap and warm water and cover with gauze again.  Leave sutures in place until office visit.     BATHING:  Ok to shower 24 hours after surgery. Please only use gentle soap and warm water and pat the incision dry. Do not submerge surgical incisions in water (baths, pools, hot tubs, lakes) until cleared by surgeon at post operative follow up visit.    DRIVING: No driving for while on pain medication    RETURN TO WORK:  No lifting more than 20 pounds until your post operative visit.  No bending, stooping, or squatting.    WALKING:    Yes    LIFTING: Avoid lifting objects heavier than 5-10 lbs for 6 weeks.    DIET:   Please stick to a high fiber diet to avoid constipation and straining.    MEDICATIONS: Take medications as prescribed. For adequate pain control, take tylenol and motrin in alternating cycles (e.g. take 500mg tylenol at 8am, take 400mg motrin at 12pm, take 500mg tylenol at 4pm, take 400mg motrin at 8pm, etc.). If prescribed narcotic medications (Norco, Percocet, or Roxicodone), use for breakthrough pain and attempt to wean off medications as soon as possible. Do not take more than 4000mg tylenol in 24 hours.     Take colace 100mg up to twice a day or Miralax 17g daily until you achieve a bowel movement. If you do not have a bowel movement for 3 days after surgery, take magnesium citrate (over the counter) daily.    FOLLOW UP: Call [phone] for follow up appointment with Dr. Abdi in 10-14 days if one has not already been made    SPECIAL INSTRUCTIONS:  After you leave the hospital, call your doctor if any of the following occurs:   Pain or symptoms that worsen   Other new symptoms   Signs of infection, including fever and chills   Nausea and/or vomiting that 
No